# Patient Record
Sex: MALE | Race: WHITE
[De-identification: names, ages, dates, MRNs, and addresses within clinical notes are randomized per-mention and may not be internally consistent; named-entity substitution may affect disease eponyms.]

---

## 2018-10-03 ENCOUNTER — HOSPITAL ENCOUNTER (EMERGENCY)
Dept: HOSPITAL 46 - ED | Age: 33
Discharge: HOME | End: 2018-10-03
Payer: COMMERCIAL

## 2018-10-03 VITALS — HEIGHT: 65 IN | BODY MASS INDEX: 24.99 KG/M2 | WEIGHT: 150 LBS

## 2018-10-03 DIAGNOSIS — Z79.899: ICD-10-CM

## 2018-10-03 DIAGNOSIS — K21.9: Primary | ICD-10-CM

## 2018-10-03 DIAGNOSIS — K52.9: ICD-10-CM

## 2019-01-03 ENCOUNTER — HOSPITAL ENCOUNTER (OUTPATIENT)
Dept: HOSPITAL 46 - DS | Age: 34
Discharge: HOME | End: 2019-01-03
Attending: SURGERY
Payer: COMMERCIAL

## 2019-01-03 VITALS — BODY MASS INDEX: 23.32 KG/M2 | WEIGHT: 139.99 LBS | HEIGHT: 65 IN

## 2019-01-03 DIAGNOSIS — R59.0: ICD-10-CM

## 2019-01-03 DIAGNOSIS — K59.00: ICD-10-CM

## 2019-01-03 DIAGNOSIS — K21.0: ICD-10-CM

## 2019-01-03 DIAGNOSIS — Z79.899: ICD-10-CM

## 2019-01-03 DIAGNOSIS — K81.9: Primary | ICD-10-CM

## 2019-01-03 PROCEDURE — BF13YZZ FLUOROSCOPY OF GALLBLADDER AND BILE DUCTS USING OTHER CONTRAST: ICD-10-PCS | Performed by: SURGERY

## 2019-01-03 PROCEDURE — 0FT44ZZ RESECTION OF GALLBLADDER, PERCUTANEOUS ENDOSCOPIC APPROACH: ICD-10-PCS | Performed by: SURGERY

## 2019-01-03 NOTE — NUR
PT SITTNG UP IN BED, ALERT AND ORIENTED. HE SEEMS PREPARED, MENTIONED THAT HE
HAS HAD PAIN AND DISCOMFORT WITH RUFUS FOR ABOUT A YEAR-LOOKING FORWARD TO
SOME RELIEF. FEW QUESTIONS, PT REQUESTED PRAYER. WILL FOLLOW AS NEEDED

## 2019-01-03 NOTE — NUR
PATIENT ARRIVES BACK TO DAY SURGERY WITH SQUINTED EYES. PATIENT IS WRITING IN
THE BED. CALL LIGHT W/IN REACH. ICED WATER GIVEN. PRN GIVEN FOR PAIN.

## 2019-01-03 NOTE — NUR
PATIENT RESTING W/EYES CLOSED. WAKES EASILY WHEN THIS RN ENTERS THE ROOM.
PATIENT DENIES URGE TO VOID. CALL LIGHT W/IN REACH.

## 2019-01-03 NOTE — NUR
01/03/19 Flex9 Lainey Whitley
1109 PT ARRIVED TO PACU WITH ORAL AIRWAY IN PLACE, O2 MASK 6L, AND
PT NONAROUSABLE TO PAINFUL STIMULI. CRNA DOING JAW TRUST TO MAINTAIN
AIRWAY.
 
1116 ORAL AIRWAY REMAINS IN PLACE, JAW THRUST NEEDED OFF AND ON TO
MAINTAIN AIRWAY. PT SLIGHTLY REACTIVE TO PAIN. VSS. RESP EVEN AND
UNLABORED BUT SHALLOW.

## 2019-01-04 NOTE — OR
[facility]
                                    [address], [place]  [address]
_________________________________________________________________________________________
                                                                 Signed   
 
 
DATE OF OPERATION:
01/03/2019
 
SURGEON:
Loyda Song MD
 
PREOPERATIVE DIAGNOSIS:
Acalculous cholecystitis.
 
POSTOPERATIVE DIAGNOSIS:
Acalculous cholecystitis with cholesterol debris within gallbladder.
 
PROCEDURES:
1. Laparoscopic cholecystectomy with intraoperative cholangiogram.
2. Surgeon-directed fluoroscopy.
 
ANESTHESIA:
General endotracheal.
 
ANESTHESIOLOGIST:
Kumar Stewart CRNA.
 
INDICATION:
This 33-year-old white man is a patient of Dr. Luz Hua and originally referred
for consideration of intractable reflux problems.  Upper endoscopy did confirm hiatal
hernia and findings suggestive of low-grade esophagitis.  Notably, however, he was not
much benefitted from PPI medication as well as Carafate, not completely so.  Further
questioning reveals he had symptoms highly suggestive of biliary disease as well.  On
that basis, gallbladder ultrasound was performed, which showed no sign of stones.
Subsequent CCK HIDA test was performed (actually fat stimulated HIDA scan), which showed
essentially no ejection fraction and reproduction of nausea and upper abdominal pain
symptoms.  On that basis, he is considered to have acalculous cholecystitis and is
admitted at this time for cholecystectomy preferred by laparoscopic approach.  The risks
of bleeding, infection, bile duct injury, need for open procedure, and failure to cure
his symptoms were reviewed in detail and he understood and wished to proceed.  Notably,
if he should not have complete resolution of his symptoms with this operation,
consideration will be made for antireflux operation. 
 
DESCRIPTION OF PROCEDURE:
The patient was brought to the operating room, given a general endotracheal anesthetic.
Preoperative antibiotic Ancef was given.  Sequential compression device stockings and
heparin subcutaneously administered.  The abdomen was clipped and prepared with a
 
    Electronically Signed By: LOYDA SONG MD  01/04/19 1620
_________________________________________________________________________________________
PATIENT NAME:     LUZ ZIEGLER                
MEDICAL RECORD #: [id number]            OPERATIVE REPORT              
          ACCT #: [id number]  
DATE OF BIRTH:   [birth year]            REPORT #: [id number]      
PHYSICIAN:        LOYDA SONG MD                 
PCP:              LUZ HUA MD           
REPORT IS CONFIDENTIAL AND NOT TO BE RELEASED WITHOUT AUTHORIZATION
 
 
                                  [facility]
                                    [address], [place]  [address]
_________________________________________________________________________________________
                                                                 Signed   
 
 
chlorhexidine solution and draped sterilely.  An infraumbilical incision was made and
using an open Kay cannula technique, pneumoperitoneum was achieved to a level of 14
mmHg with carbon dioxide gas.  Intraabdominal inspection showed no sign of ascites or
carcinomatosis.  The liver appeared normal.  Three additional trocars were placed in
usual configuration in the subxiphoid, right midclavicular, and right anterior axillary
line.  A 10 mm port was used in the epigastric area.  Ports of 5 mm were used in the
subcostal area. 
 
The gallbladder was exposed and elevated and adhesions of omentum to the undersurface
were taken down with blunt electrocautery dissection for better elevation of the
gallbladder itself.  The infundibulum was grasped and using blunt and electrocautery
dissection with a very meticulous technique, the cystic duct was dissected free from
surrounding structures.  A small branch of the cystic artery to the gallbladder proper
was noted and secured and divided as well.  The cystic duct was surprisingly small in
its size.  A clip was applied across gallbladder cystic duct junction and a transverse
choledochotomy made in the cystic duct.  Attempts to intubate the cystic duct with the
Martínez type cholangiocatheter were unsuccessful.  An additional transverse choledochotomy
was made in the cystic duct and mindful that the duct was nearly the size of the
catheter itself.  Ultimately, the tip of the catheter could be insinuated into the
opening though not threaded down the cystic duct.  This was enough to allow for flow,
however. 
 
Intraoperative cholangiography was undertaken using surgeon-directed fluoroscopy showing
free flow of contrast in biliary tree with prompt emptying into the duodenum.  There was
no sign of biliary anomaly, filling defect or other abnormality.  The catheter was
removed and the cystic duct was triply clipped and divided and the gallbladder dissected
free in a retrograde fashion using electrocautery.  A small rent was made in the
gallbladder, which allowed for spillage of bile, but no stones.  This was quickly
grasped and only minimal bile spillage was noted.  The gallbladder was placed in an
endobag and extracted through the infraumbilical port site without problem, opened on
the back table and found to have chronic inflammatory change as well as bits of yellow
cholesterol stone type debris.  There are no well-formed stones, however. 
 
Irrigation was undertaken in subhepatic space and over the liver clearing the fluid
entirely.  The clips appeared to be secure in the cystic duct and elsewhere.  The
trocars were removed under direct visualization showing no sign of bleeding. 
 
The infraumbilical incision was secured with interrupted 0 Vicryl suture as well as a
running 0 PDS suture.  Irrigation was undertaken in all the wounds.  A 20 mL of 0.25%
Marcaine with epinephrine was injected locally.  The skin was then closed with
interrupted 3-0 Vicryl.  Steri-Strips were applied.  The patient was ultimately
extubated and transferred to recovery in good condition having suffered no complication.
 
    Electronically Signed By: LOYDA SONG MD  01/04/19 1620
_________________________________________________________________________________________
PATIENT NAME:     LUZ ZIEGLER                
MEDICAL RECORD #: [id number]            OPERATIVE REPORT              
          ACCT #: [id number]  
DATE OF BIRTH:   [birth year]            REPORT #: [id number]      
PHYSICIAN:        LOYDA SONG MD                 
PCP:              LUZ HUA MD           
REPORT IS CONFIDENTIAL AND NOT TO BE RELEASED WITHOUT AUTHORIZATION
 
 
                                  [facility]
                                    [address] [place]  [address]
_________________________________________________________________________________________
                                                                 Signed   
 
 
 Sponge, needle, and instrument counts reported as correct x3. 
 
 
 
            ________________________________________
            MD HAYLEY Combs/TAMMY
Job #:  [id number]
DD:  01/03/2019 11:15:59
DT:  01/03/2019 12:40:38
 
cc:            Luz Hua MD
 
 
Copies:  LUZ HUA MD
~
 
 
 
 
 
 
 
 
 
 
 
 
 
 
 
 
 
 
 
 
 
 
 
 
 
    Electronically Signed By: LOYDA SONG MD  01/04/19 1620
_________________________________________________________________________________________
PATIENT NAME:     LUZ ZIEGLER                
MEDICAL RECORD #: [id number]            OPERATIVE REPORT              
          ACCT #: [id number]  
DATE OF BIRTH:   [birth year]            REPORT #: [id number]      
PHYSICIAN:        LOYDA SONG MD                 
PCP:              LUZ HUA MD           
REPORT IS CONFIDENTIAL AND NOT TO BE RELEASED WITHOUT AUTHORIZATION

## 2020-05-22 NOTE — OR
[facility]
                                    [address], [place]  [address]
_________________________________________________________________________________________
                                                                 Signed   
 
 
DATE OF OPERATION:
05/22/2020
 
SURGEON:
Loyda Song MD
 
PREOPERATIVE DIAGNOSES:
Severe and worsening gastroesophageal reflux symptoms including spontaneous
regurgitation, consideration for operative management. 
 
POSTOPERATIVE DIAGNOSES:
1. Small hiatal hernia with mild chronic distal esophagitis.
2. Antral gastritis without evidence of H. pylori on CLOtest.
3. Mildly enlarged ampulla of Vater.
 
PROCEDURE:
Esophagogastroduodenoscopy with biopsy.
 
ANESTHESIA:
Intravenous sedation, fentanyl 100 mcg, Versed 4 mg total.
 
INDICATIONS:
This 35-year-old white man is patient of Dr. Luz Hua and is a  in
the [employer] [facility].  He was seen a year ago or so with reflux problems, at which
point he was taking 4 Prilosec daily.  Upper endoscopy did confirm a small hiatal
hernia.  Consideration been made for anti-reflux operation.  He did undergo video
esophagram, which showed significant spontaneous regurgitation and reflux and only a
small hiatal hernia.  Time has not improved the situation much.  He is markedly disabled
by what sounds like spontaneous regurgitation and reflux type symptoms.  The symptoms
are worsened by red meat.  Notably, he has had cholecystectomy in the past.  He has most
benefitted by Carafate taken on a q.i.d. basis.  He did not have much improvement with
PPI medication previously.  He is admitted at this time to undergo upper endoscopy,
anticipating possible anti-reflux operation.  The risks of bleeding, infection, and
perforation related to upper endoscopy was reviewed with him.  He understands and wished
to proceed. 
 
FINDINGS:
The vocal cords and surrounding soft tissue were normal.  There was no sign of
inflammation or edema.  The esophagus itself looked reasonably good except in the distal
portion, where there was mild inflammation.  There was no sign of Parry's epithelium
or stricture.  The stomach itself had a small amount of bile within the rugal folds
appeared normal.  There was antral gastritis, but without sign of ulceration proper.
 
    Electronically Signed By: LOYDA SONG MD  05/22/20 2230
_________________________________________________________________________________________
PATIENT NAME:     TONEYLUZ JORGE                
MEDICAL RECORD #: [id number]            OPERATIVE REPORT              
          ACCT #: [id number]  
DATE OF BIRTH:   [birth year]            REPORT #: [id number]      
PHYSICIAN:        LOYDA SONG MD                 
PCP:              LUZ HUA MD           
REPORT IS CONFIDENTIAL AND NOT TO BE RELEASED WITHOUT AUTHORIZATION
 
 
                                  [facility]
                                    [address], [place]  [address]
_________________________________________________________________________________________
                                                                 Signed   
 
 
The pylorus was normal.  The duodenum was normal except for mildly enlarged ampulla of
Vater.  Retroflexed view did show a small hiatal hernia.  The retroflexed scope could be
withdrawn into the esophagus itself testifying to the poor flap valve itself.  CLOtest
was negative 15 minutes post procedure. 
 
DESCRIPTION OF PROCEDURE:
The patient was brought to the endoscopy suite and given topical Hurricaine spray
hypopharyngeal anesthesia.  A bite block was placed and intravenous sedation given to
the point of slurred speech and nystagmus with full cardiopulmonary monitoring. 
 
An Olympus video upper endoscope was passed in the hypopharynx.  The vocal cords were
visualized as normal as was the surrounding soft tissue.  Scope was advanced to the
esophagus without problem throughout its length, it looked reasonably normal except in
the distal portion where there was mild chronic inflammatory change.  The scope was
passed to the stomach, which was insufflated with air.  A small amount of bilious fluid
within it.  Antral motility was normal.  Rugal folds were normal.  The antrum did have a
punctate appearance suggestive of chronic inflammation.  Pylorus was normal without sign
of distortion.  The scope was passed through it and the duodenum.  Duodenum proper was
normal.  However, the ampulla was slightly enlarged.  Biopsies were taken of the
duodenal mucosa to assess for celiac disease.  The scope was then withdrawn to the
distal stomach.  Antral biopsies performed for both BEATRICE and pathologic testing.
Retroflexed view was undertaken, showing a flap valve that was present, but somewhat
loose and was space next to the scope of about 1 cm or 2.  Retroflexed view and
withdrawal of scope in the J position, allowed for visualization of the esophagus
testifying to the lax tone of the flap valve.  The scope was straightened withdrawn.
Biopsies taken of the distal esophageal mucosa.  Further withdrawal of the biopsy of the
mid esophagus.  Scope was removed.  The patient was taken to recovery room in good
condition. 
 
CONCLUDING DIAGNOSES:
1. Poor flap valve and clinical significant and severe medically refractory
gastroesophageal reflux. 
2. Antral gastritis.
 
PLAN:
We will review his pathology reports in light of his symptoms.  I have asked him to
continue with his Carafate as usual, but he has added Prilosec foot to assess if it has
any benefit to symptom control.  Consideration is made for anti-reflux surgery in the
near future per his request. 
 
 
 
 
    Electronically Signed By: LOYDA SONG MD  05/22/20 8050
_________________________________________________________________________________________
PATIENT NAME:     LUZ ZIEGLER JORGE                
MEDICAL RECORD #: [id number]            OPERATIVE REPORT              
          ACCT #: [id number]  
DATE OF BIRTH:   [birth year]            REPORT #: [id number]      
PHYSICIAN:        LOYDA SONG MD                 
PCP:              LUZ HUA MD           
REPORT IS CONFIDENTIAL AND NOT TO BE RELEASED WITHOUT AUTHORIZATION
 
 
                                  [address]  [address]
_________________________________________________________________________________________
                                                                 Signed   
 
 
            ________________________________________
            MD HAYLEY Combs/MODL
Job #:  [id number]
DD:  05/22/2020 10:12:13
DT:  05/22/2020 12:45:35
 
cc:            Luz Hua MD
 
 
Copies:  LUZ HUA MD
~
 
 
 
 
 
 
 
 
 
 
 
 
 
 
 
 
 
 
 
 
 
 
 
 
 
 
 
 
 
    Electronically Signed By: LOYDA SONG MD  05/22/20 2230
_________________________________________________________________________________________
PATIENT NAME:     LUZ ZIEGLER                
MEDICAL RECORD #: [id number]            OPERATIVE REPORT              
          ACCT #: [id number]  
DATE OF BIRTH:   [birth year]            REPORT #: [id number]      
PHYSICIAN:        LOYDA SONG MD                 
PCP:              LUZ HUA MD           
REPORT IS CONFIDENTIAL AND NOT TO BE RELEASED WITHOUT AUTHORIZATION

## 2020-05-22 NOTE — NUR
05/22/20 1028 Sheets,Lainey
1005 PT ARRIVED TO PACU, ASLEEP. VSS. RESP EVEN AND UNLABORED ON 3L
VIA NC. MASK PLACED ON PT.
 
1014 MD AT BEDSIDE TALKING TO PT.

## 2020-05-26 NOTE — PATH
[facility]
                                    [address], [place]  [address]
_________________________________________________________________________________________
                                                                 Signed   
 
 
 
SPECIMEN(S): A DUODENUM
SPECIMEN(S): B ANTRUM/PYLORUS
SPECIMEN(S): C LOWER ESOPHAGUS
SPECIMEN(S): D MIDDLE ESOPHAGUS
 
SPECIMEN SOURCE:
A. DUODENUM
B. ANTRUM/PYLORUS
C. LOWER ESOPHAGUS
D. MIDDLE ESOPHAGUS
 
CLINICAL HISTORY:
Reflux. Postop: Mild esophagitis, mild hiatal hernia.
MICROSCOPIC DESCRIPTION:
Histologic sections of all submitted blocks are examined by light microscopy. 
These findings, together with the gross examination, support the pathologic 
diagnosis. 
 
FINAL PATHOLOGIC DIAGNOSIS:
A. Duodenum, biopsy:
-  Duodenal mucosa with no histopathologic abnormality.
-  Negative for dysplasia or malignancy.
B.  Stomach, antrum, biopsy:
-  Antral/oxyntic mucosa with chronic, inactive gastritis.
-  Negative for Helicobacter organisms on HE stain.
-  Negative for dysplasia or malignancy.
C.  Esophagus, lower, biopsy:
-  Squamous mucosa with chronic inflammation and reactive changes, compatible 
with reflux esophagitis. 
-  Negative for intestinal metaplasia, dysplasia, or malignancy.
D.  Esophagus, middle, biopsy:
-  Squamous mucosa with no histopathologic abnormality.
-  Negative for intestinal metaplasia, dysplasia, or malignancy.
NAL:cml:C2NR
 
GROSS DESCRIPTION:
Four specimens are received in four containers, labeled "SCOOBY."
A.  The specimen, labeled "SCOOBY, duodenum biopsy," is received in formalin and 
consists of two tan soft tissue fragment(s) that measure 0.1 cm in greatest 
dimension. The specimen is entirely submitted 
in cassette (A1).
 
                                                                                    
_________________________________________________________________________________________
PATIENT NAME:     LUZ ZIEGLER                
MEDICAL RECORD #: [id number]            PATHOLOGY                     
          ACCT #: [id number]       ACCESSION #: [id number]     
DATE OF BIRTH:   [birth year]            REPORT #: [id number]       
PHYSICIAN:        CHARO SHELLEY              
PCP:              LUZ BOATENG MD           
REPORT IS CONFIDENTIAL AND NOT TO BE RELEASED WITHOUT AUTHORIZATION
 
 
                                  [facility]
                                    [address], [place]  [address]
_________________________________________________________________________________________
                                                                 Signed   
 
 
B.  The specimen, labeled "SCOOBY, antrum biopsy," is received in formalin and 
consists of two tan soft tissue fragment(s) that measure 0.2 cm in greatest 
dimension. The specimen is entirely submitted in 
cassette (B1).
C.  The specimen, labeled "SCOOBY, lower esophagus biopsy," is received in formalin 
and consists of four tan soft tissue fragment(s) that measure 0.4 cm in 
greatest dimension. The specimen is entirely 
submitted in cassette (C1).
D.  The specimen, labeled "SCOOBY, middle esophagus biopsy," is received in 
formalin and consists of one tan soft tissue fragment that measures 0.1 cm in 
greatest dimension. The specimen is entirely 
submitted in cassette (D1).
 
JS (under the direct supervision of a pathologist)
The Gross Description was prepared using a voice recognition system.  The 
report was reviewed for accuracy; however, sound-alike word errors, addition 
and/or deletions may occur.  If there is any 
question about this report, please contact Client Services.
 
PERFORMING LABORATORY:
The technical component was performed by [facility], [address] [place] [id number] (Medical Director: Barbara Nguyen MD; CLIA# [id number]). 
[facility], [address] [address] (CLIA# [id number]). 
 
Diagnostician:  Phyllis Shaikh MD
Pathologist
Electronically Signed 05/26/2020
 
 
Copies:                                
~
 
 
 
 
 
 
 
 
 
 
                                                                                    
_________________________________________________________________________________________
PATIENT NAME:     LUZ ZIEGLER                
MEDICAL RECORD #: [id number]            PATHOLOGY                     
          ACCT #: [id number]       ACCESSION #: [id number]     
DATE OF BIRTH:   [birth year]            REPORT #: [id number]       
PHYSICIAN:        CHARO SHELLEY              
PCP:              LUZ BOATENG MD           
REPORT IS CONFIDENTIAL AND NOT TO BE RELEASED WITHOUT AUTHORIZATION

## 2020-06-12 NOTE — DS
[facility]
                                    [address], [place]  [address]
_________________________________________________________________________________________
                                                                 Draft    
 
 
ADMISSION DATE:  06/23/2020
 
DISCHARGE DATE:  06/26/2020
 
REASON FOR ADMISSION:
This 35-year-old white man is a patient of Dr. Luz Hua and has been evaluated
in the past in 2018 for rather significant reflux symptoms.  Over time, he has had a
progression of his symptoms despite maximal medical therapy including PPI medication and
Carafate.  He has spontaneous regurgitation in bending over.  Upper endoscopy confirmed
chronic esophagitis without Parry's epithelium and a small hiatal hernia.  A video
esophagram in December of 2018 showed normal motility, a small hiatal hernia, and
moderate-to-massive reflux with Valsalva maneuver.  He does have spontaneous
regurgitation.  He is admitted at this time to undergo Hill posterior gastropexy
(reconstruction of the gastroesophageal junction) with intraoperative manometrics. 
 
PERTINENT PHYSICAL EXAMINATION:
GENERAL:  Pleasant white man, in no acute distress. 
CHEST:  Clear. 
HEART:  Regular without murmur. 
ABDOMEN:  Soft and flat.  Easily palpated.  There is no mass, tenderness, or ascites. 
EXTREMITIES:  Show no clubbing, cyanosis, or edema.
 
HOSPITAL COURSE:
On June 23, 2020, he underwent Hill repair (reconstruction of the gastroesophageal
junction with posterior gastropexy).  He had preoperative placement of the quadratus
lumborum block and intraoperative placement of On-Q pain pump catheter.  Intraoperative
manometric showed a peak pressure of approximately 48 mmHg over a 4 cm intraabdominal
segment.  The reconstructed flap valve felt optimal. 
 
Postoperatively, for the first 12 hours, he was maintained with a decompressive
manometric tube and then tube was removed the following day.  He was begun on clear
liquids, which he tolerated well and was advanced to a full liquid diet which he
tolerated well also.  He does have episodic belching, but absolutely no reflux symptoms,
no spontaneous regurgitation, and tolerating pain pills by discharge quite well.  He
will maintain a full liquid diet for the time being with special care to avoid meat and
bread. 
 
We will plan to see him back in approximately 4 weeks and liberalize his diet at that
time.  Attempts were made to maintain an opiate-free operative experience, though he did
require very limited occasions of Dilaudid intravenously administered and Dilaudid oral
pain medication, but rare indeed was the need for use of it. 
 
 
                                                                                    
_________________________________________________________________________________________
PATIENT NAME:     LUZ ZIEGLER                
MEDICAL RECORD #: [id number]            DISCHARGE SUMMARY             
          ACCT #: [id number]  
DATE OF BIRTH:   [birth year]            REPORT #: [id number]      
PHYSICIAN:        LOYDA SONG MD                 
PCP:              LUZ HUA MD           
REPORT IS CONFIDENTIAL AND NOT TO BE RELEASED WITHOUT AUTHORIZATION
 
 
                                  [facility]
                                    [address], [place]  [address]
_________________________________________________________________________________________
                                                                 Draft    
 
 
MEDICATIONS WILL INCLUDE:
1. Dilaudid 2 mg tablets 2 to 4 mg p.o. q.4 hours as needed for pain, #14, no refill.
2. Tylenol Extra Strength 500 mg tablets, 2 tablets p.o. crushed and taken in jelly or
other oral substance q.6 hours as needed for pain. 
3. Motrin 600 mg p.o. q.6 hours as needed for pain, also crushed.
4. He will continue his usual medication of terbinafine 250 mg p.o. as needed and
fluticasone Allergy Relief spray nasally for allergies.  He will discontinue his
sucralfate tablet. 
 
DISCHARGE DIAGNOSES:
1. Medically refractory gastroesophageal reflux with small hiatal hernia and massive
reflux on upper GI, status post Hill repair (reconstruction of the gastroesophageal
junction with posterior gastropexy and intraoperative manometrics on June 23, 2020). 
2. Reactive airways.
 
 
 
            ________________________________________
            MD HAYLEY Combs/MODL
Job #:  [id number]
DD:  06/26/2020 15:07:10
DT:  06/26/2020 19:43:56
 
cc:            Luz Hua MD
 
 
Copies:  LUZ HUA MD
~
 
 
 
 
 
 
 
 
 
 
 
 
                                                                                    
_________________________________________________________________________________________
PATIENT NAME:     LUZ ZIEGLER                
MEDICAL RECORD #: [id number]            DISCHARGE SUMMARY             
          ACCT #: [id number]  
DATE OF BIRTH:   [birth year]            REPORT #: [id number]      
PHYSICIAN:        LOYDA SONG MD                 
PCP:              LUZ HUA MD           
REPORT IS CONFIDENTIAL AND NOT TO BE RELEASED WITHOUT AUTHORIZATION

## 2020-06-23 NOTE — NUR
ASSESSMENT COMPLETED. NO CHANGES. PT ASSISTED TO SIDE OF BED TO ATTEMPT TO
VOID. WIFE AT BEDSIDE. REPORTS PAIN 5/10.

## 2020-06-23 NOTE — NUR
POST OP VITALS TAKEN AND STABLE. NO CHANGE TO SHADOWING OD MIDLINE INCISION.
PT REPORTING PAIN 5/10. WATER PROVIDED. DENIES NAUSEA. PT STILL DROWSY. HAS
NOT OPENED EYES, JUST TALKS TO NURSE.

## 2020-06-23 NOTE — NUR
06/23/20 1002 Gretchen Hu
0936 PATIENT ARRIVES TO PACU RESTING WITH EYES CLOSED. DOES NOT OPEN
EYES WITH VERBAL COMMAND. DOES MOVE ARMS WITH PAINFUL STIMULI, AND
REPOSITIONS SELF TO RIGHT SIDE. RESP EVEN AND UNLABORED, MASK AT 6
LITERS. NG LEFT NARE WITH 4 PORT CLAMPED. ON-Q PUMP WITH 2 PORTS
UNCLAMPED ON ARRIVAL TO PACU.
1000 PATIENT CONTINUES TO REST WITH EYES CLOSED. RESP EVEN AND
UNLABORED, MASK CONTINUED AT 6 LITERS.

## 2020-06-23 NOTE — NUR
BEDSIDE REPORT RECEIVED FROM MARSHA ELLIOTT. pt RESTING IN BED, HOB ELEVATED. NGT
TO LOW INT WALL SUCTION. pt RATES PAIN 4/10 IN ABDOMEN, SMALL AMT SS DRAINAGE
ON MIDLINE DRESSING, ON Q PUMP IN PLACE. CALL LIGHT IN REACH. WIFE IN ROOM. NO
REQUESTS AT THIS TIME, PLAN TO AMBULATE HALLWAY THIS EVENTING.

## 2020-06-23 NOTE — NUR
CALL LIGHT ANSWERED. PRN OFIRMEV ADMINISTERED FOR ABDOMINAL PAIN. IV FLUSHED,
FLUID INFUSING WNL AS ORDERED. pt C/O NAUSEA, PRN NAUSEA MEDICATION
ADMINISTERED. BOWEL TONES HYPOACTIVE, ABD DRAINAGE UNCHANGED ON MIDLINE
DRESSING. pt DEMONSTRATES IS USE X 3. DROWSY AFTER PRN NAUSEA MEDICATION. CALL
LIGHT IN REACH. WIFE IN ROOM. SPO2 WNL ON RA. VSS.

## 2020-06-23 NOTE — NUR
PHONE CALL FROM MD, ORDERS RECEIVED FOR PRN LOSANGES, PRN OFIRMEV, NO
CARBONATED BEVERAGES, NO HOT OR COLD BEVERAGES REPEATED BACK. OKAY FOR pt NOT
TO AMBULATE TONIGHT TO AVOID DRY HEAVING, ABDOMINAL STRAINING. LEAVE NGT IN
MORNING, MD WILL ASSESS. ALL ORDERS REPEATED BACK TO VERIFY.

## 2020-06-23 NOTE — NUR
PT REPORTING 5/10 PAIN, TYLENOL/TORDOL UNVAILABLE. 0.5MG DILAUDID ADMINSTERED.
CPOX IN PLACE. LR AT 85 INFUSING. PT DRANK SOME WATER. REFUSING TO TRY LIQUID
LUNCH TRAY. ATTEMPTED VOID UNSUCCESSFUL. CALL LIGHT IN REACH.

## 2020-06-23 NOTE — NUR
PT DOING WELL. VOIDED 200ML SINCE SURGERY. AMBULATED HALLS X1 LAP. PAIN
CONTROLLED WITH TORIDOL AND TYLENOL. DILAUDID USED ONE TIME D/T TYLENOL AND
TORDOL BEING UNAVALALE. NG TUBE TO LIWS. MINDLINE WITH SOME SHADOWING BUT WNL.
ON-CUE PUMP IN PLACE. BOWEL TONES HYPOACTIVE. CLEARS FOR COMFORT. DC NG TUBE
IN AM.

## 2020-06-23 NOTE — NUR
PT ARRIVED TO FLOOR VIA BED. PT IS DROWSY BUT ANSWERS QUESTIONS APPROPRIATLY.
LR AT 85 STARTED. NG TUBE PLACED TO LIWS. SCD'S ON. MIDLINE DRESSING WITH SOME
SMALL SHADOWING. ON CUE PUMP IN PLACE. VITALS TAKEN AND STABLE.

## 2020-06-23 NOTE — NUR
CALL LIGHT ANSWERED. URINAL EMPTIED X 2, 650 ML VOID, CLEAR YELLOW URINE. pt
DENIES PAIN AT THIS TIME STATES "I'M OKAY". NGT IN PLACE. IVF INFUSING WNL AS
ORDERED. SCDS ON. pt EDUCATED ON ROOM TEMP PO FLUIDS, PLAN OF CARE. QUESTIONS
ANSWERED. CALL LIGHT IN REACH.

## 2020-06-23 NOTE — OR
[facility]
                                    [address], [place]  [address]
_________________________________________________________________________________________
                                                                 Signed   
 
 
DATE OF OPERATION:
06/23/2020
 
SURGEON:
Loyda Snog MD
 
PREOPERATIVE DIAGNOSES:
1. Medically refractory gastroesophageal reflux disease with small hiatal hernia.
2. Spontaneous regurgitation.
 
POSTOPERATIVE DIAGNOSES:
1. Medically refractory gastroesophageal reflux disease with small hiatal hernia.
2. Spontaneous regurgitation.
 
PROCEDURE:
1. Hill repair (reconstruction of the GE junction with posterior gastropexy).
2. Intraoperative manometrics.
3. Placement of On-Q pain pump catheter, bilateral subcostal TAP block type.
 
ANESTHESIA:
General endotracheal, Neftaly Kevin CRNA. 
Pre operative quadratus lumborum block
 
INDICATION:
This 35-year-old white man is a patient of Dr. Luz Hua.  He was evaluated in
the past by me in 2018 for rather significant reflux problems.  He was marginally
benefitted by omeprazole, but ultimately most benefitted by Carafate.  He has had
recurrence and persistence of his symptoms over time and complains of spontaneous
regurgitation in particular after bending over, straining, or lifting with full stomach.
 He did undergo videoesophagram to assess motility on December 3, 2018 showing normal
motility, small hiatal hernia and moderate to massive reflux with Valsalva maneuver.  He
is not excessively obese, but he notes when he increases his weight generally, his
symptoms are worsened.  More recently, he has been taking PPI medication at least b.i.d.
as well as Carafate.  An upper endoscopy has been performed, which showed chronic
esophagitis and a small hiatal hernia.  No sign of neoplasm or Parry's epithelium. 
 
After full consideration of the various options of management, both surgical and
nonsurgical and vrious types
of operative interventions including endoscopic,  laparoscopic
and open type procedures, he opts to undergo Hill posterior gastropexy by open technique
with intraoperative manometrics.  The risks of bleeding, infection, failure of the
operation, splenic injury, and other unforeseen complications were all reviewed in
 
    Electronically Signed By: LOYDA SONG MD  06/23/20 1420
_________________________________________________________________________________________
PATIENT NAME:     ZIEGLERLUZ ALEJO JORGE                
MEDICAL RECORD #: [id number]            OPERATIVE REPORT              
          ACCT #: [id number]  
DATE OF BIRTH:   [birth year]            REPORT #: [id number]      
PHYSICIAN:        LOYDA SONG MD                 
PCP:              LUZ HUA MD           
REPORT IS CONFIDENTIAL AND NOT TO BE RELEASED WITHOUT AUTHORIZATION
 
 
                                  [facility]
                                    [address], [place]  [address]
_________________________________________________________________________________________
                                                                 Signed   
 
 
detail.  He understands and wished to proceed. 
 
FINDINGS:
Though the patient is not obese, he had a fair amount of intraabdominal adiposity after
all.  This included the omentum.  The GE junction was somewhat patulous.  The spleen was
normal as was the liver.  There was surgical absence of the gallbladder.  Reconstructed
GE junction was accomplished without problem showing an optimal palpable flap valve and
intraoperative manometric showing a peak pressure of approximately 40 mmHg over a 3-4 cm
intraabdominal segment.  The palpable reconstructed flap valve appeared optimal. 
 
Notably, the patient underwent a quadratus lumborum block by the anesthetist prior to
anesthesia.  On-Q pain pump catheters were placed as well.  An opiate-free anesthetic
has thus far been accomplished. 
 
DESCRIPTION OF PROCEDURE:
The patient was brought to the operating room from the preoperative area having
undergone a quadratus lumborum block by the anesthetist.  He was given a general
endotracheal anesthetic without problem.  Preoperative antibiotic Ancef was given.
Sequential compression device stockings were used and heparin subcutaneously
administered.  An incision was made extending from the tip of the xiphoid to above the
umbilicus later extended inferiorly for better exposure.  The abdomen was entered
without problem showing no sign of ascites or carcinomatosis.  The colon appeared normal
as did the stomach.  Liver was quite normal.  An upper hand retractor was used to
elevate this costal margins.  Palpation behind the spleen showed it to be without
scarring.  A rolled pack was placed behind the spleen to take tension off the medial
aspect of it. 
 
The left lateral segment of liver was elevated and the gastrohepatic omentum incised
with electrocautery.  Better exposure was afforded by mobilization of the left lateral
segment of the liver freeing the diaphragmatic attachments with electrocautery and
retracting it medially. 
 
A Bookwalter retractor was attached to the table.  Medial retraction of the
left lateral segment of the liver allowed for 
good visualization of the caudate lobe of the
liver and GE junction area. 
 
Using a Bookwalter retractor and retracting the left lateral segment medially, the
phrenoesophageal ligament adherent to the right lisset was incised with electrocautery and
blunt dissection. A Martin clamp was applied to the right lisset and sequentially
manipulated allowing for incision of the membrane securing the crura of the diaphragm to
the GE junction and the esophagus. 
 
    Electronically Signed By: LOYDA SONG MD  06/23/20 3622
_________________________________________________________________________________________
PATIENT NAME:     TONEYLUZ PATEL                
MEDICAL RECORD #: [id number]            OPERATIVE REPORT              
          ACCT #: [id number]  
DATE OF BIRTH:   [birth year]            REPORT #: [id number]      
PHYSICIAN:        LOYDA SONG MD                 
PCP:              LUZ HUA MD           
REPORT IS CONFIDENTIAL AND NOT TO BE RELEASED WITHOUT AUTHORIZATION
 
 
                                  [address]  [address]
_________________________________________________________________________________________
                                                                 Signed   
 
 
 
Elevation of the proximal stomach allowed for visualization of the left lisset.  This too
was freed with both electrocautery and blunt dissection.  Complete freeing of the
gastrophrenic attachments was undertaken.  There was no strict need for dividing any
short gastric vessels.  Meticulous care was maintained to free the esophagus and GE
junction from its surrounding filmy attachments.  Using the hook retractor, the stomach
was retracted to the left allowing for dissection of the preaortic fascia inferiorly to
the transverse portion of the pancreas. 
 
Consideration was made for elevating the preaortic fascia from the underlying aorta and
passing a Goodell cervical dilator however, the tissue was rather stout and it was
deemed safer to avoid such a dissection.
 given the crowded field we were dealing with.  On that
basis, security of the wrap for gastropexy would be to the preaortic fascia simply
elevating it from the underlying aorta. 
 
The left and right crura were reapproximated with two separate interrupted 0 silk
sutures with Rinku felt pledgets soaked in Betadine.  The index finger was allowed to
pass alongside the esophagus confirming that it was not excessively tight. 
 
The anterior and posterior phrenoesophageal bundles were grasped with Elton clamps to
allow precise application of the repair sutures. 
 
Sutures used for repair 
were 0 Ethibond with Rinku felt pledgets  soaked in Betadine.  This
included a seromuscular bite of the anterior stomach and anterior bundle as well as the
posterior bundle ultimately pexing to the base of the right lisset at the condensation of
the preaortic fascia using the previously placed silk sutures to elevate the preaortic
fascia away from the underlying aorta.  Four such repair sutures were placed in the
technique of David. 
 
The sutures were then secured with surgeon's throw.  Palpation of the resultant
flap valve showed it to be good. 
 
Plans were then made for intraoperative manometrics.  Using a perfusion catheter and
arterial blood pressure catheter system, intraoperative manometrics were performed in
the standard way.  The pullout pressure appeared to be approximately 38 mmHg over a 3-4
cm segment.  This was reproducible.  The sutures were secured and the calibration
undertaken showing a good waveform and optimal peak pressure.  The nasogastric
manometric tube was secured in place and allowed for decompression. 
 
Irrigation was undertaken and photographs were taken as well.  The fundus of the stomach
 
    Electronically Signed By: LOYDA SONG MD  06/23/20 1420
_________________________________________________________________________________________
PATIENT NAME:     LUZ ZIEGLER                
MEDICAL RECORD #: [id number]            OPERATIVE REPORT              
          ACCT #: [id number]  
DATE OF BIRTH:   [birth year]            REPORT #: [id number]      
PHYSICIAN:        LOYDA SONG MD                 
PCP:              LUZ HUA MD           
REPORT IS CONFIDENTIAL AND NOT TO BE RELEASED WITHOUT AUTHORIZATION
 
 
                                  [facility]
                                    [address] [place] [address]  [address]
_________________________________________________________________________________________
                                                                 Signed   
 
 
was secured to the margin of the esophageal hiatus of the diaphragm to avoid herniation
of the cardia into the mediastinum.  This was accomplished with 0 silk sutures with
Rinku felt pledgets.  Palpation of the flap valve showed it to be optimal in the
decompressive manometric tube manipulated into optimal position for decompression
postoperatively. 
 
Plans were then made for closure.  The left lateral 
segment of the liver was allowed to return to its
natural position.  The pack behind the spleen was removed without problem.  Left and
right On-Q pain pump catheters were placed just superficial to the transversus abdominis
muscle bilaterally.  The midline fascia was reapproximated with running bidirectional #1
PDS suture.  Subcutaneous tissue was irrigated.  Skin closed with running subcuticular
3-0 Vicryl.  Steri-Strips were applied.  Op Sites were applied to the On-Q catheters and
the catheters were secured to the On-Q pump device.  A silver sponge dressing was
applied. 
 
The patient was ultimately extubated and transferred to recovery room in good condition
having suffered no known complications.  Sponge, needle, and instrument counts were
reported as correct.  Blood loss was rather minimal certainly less than 25 mL in
aggregate. 
 
 
 
            ________________________________________
            MD HAYLEY Combs/MODL
Job #:  [id number]
DD:  06/23/2020 10:10:20
DT:  06/23/2020 11:18:23
 
cc:            Luz Hua MD
 
 
Copies:  LUZ HUA MD
~
 
 
 
 
 
 
 
    Electronically Signed By: LOYDA SONG MD  06/23/20 1420
_________________________________________________________________________________________
PATIENT NAME:     LUZ ZIEGLER                
MEDICAL RECORD #: [id number]            OPERATIVE REPORT              
          ACCT #: [id number]  
DATE OF BIRTH:   [birth year]            REPORT #: [id number]      
PHYSICIAN:        LOYDA SONG MD                 
PCP:              LUZ HUA MD           
REPORT IS CONFIDENTIAL AND NOT TO BE RELEASED WITHOUT AUTHORIZATION

## 2020-06-23 NOTE — NUR
PT REFUSING TO GET OOB TO VOID D/T 6/10 PAIN WITH MOVEMENT. IV TYLENOL
ADMISNTERED. PT AGGREED TO ATTEMPT TO VOID AFTER AND AMBULATE HALLS.

## 2020-06-24 NOTE — NUR
PT UP AND WALKING PAULSON WITH WIFE AT THIS TIME. PT DENIES ANY NAUSEA BUT DOES
STATE HE HAS PAIN WITH MOVEMENT

## 2020-06-24 NOTE — NUR
IN PTS ROOM TO GIVE MORNING MEDS AND DO ASSESSMENT. PT STATES THAT HIS PAIN IS
A 3/10. PT STATES THAT HIS NG TUBE IS NOT AS ANNOYING THIS AM. DISUCSSED WITH
PT THE PLAN FOR THE DAY, TRY TO GET PT UP AND WALKING, WE WILL DO A ROAD TEST
TO THE RESTROOM BEFORE HAVING PT WALK OUT IN THE HALLS. PT AGREEABLE TO THIS
PLAN.

## 2020-06-24 NOTE — NUR
pt SLEEPING, AWAKENS TO VOICE. VSS. RATES PAIN 3/10 IN ABDOMEN, PRN TORADOL
ADMINISTERED FOR PAIN CONTROL. APPLE JUICE PROVIDED. CALL LIGHT IN REACH. NO
ADDITIONAL REQUESTS.

## 2020-06-24 NOTE — NUR
PATIENT UP IN BED, EYES CLOSED, WOKE FOR VITALS. VITALS AND I&OS CHARTED. CALL
LIGHT IN REACH, NO OTHER NEEDS AT THIS TIME.

## 2020-06-24 NOTE — NUR
received report from sidney harrell. pt sitting up in bed. pt states that he is
starting to wake up at this time

## 2020-06-24 NOTE — NUR
Spoke with pt and his wife.  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  Pt and wife state he will ahve good support.  No
DME.  They live with their two small children.  Planned ahead and will have
childcare for the next two weeks.  Pt denies needs to go home, wife feels safe
taking him home.

## 2020-06-24 NOTE — NUR
pt RESTING IN BED WITH HOB ELEVATED, EYES CLOSED, APPEARS TO BE SLEEPING.
BREATHING UNLABORED. SPO2 WNL ON RA.

## 2020-06-24 NOTE — NUR
PT PUT ON CALL LIGHT TO REQUEST MORE PAIN MEDS. PT RATED PAIN 6/10. PROVIDED
PT WITH 30MG OF TORADOL AT THIS TIME.
PT ALSO STATED THAT THE ENSURE IS SITTING WELL ON HIS STOMACH AND HAS NO NOTED
NAUSEA AT THIS TIME

## 2020-06-24 NOTE — NUR
pt'S PAIN WELL CONTROLLED THROUGHOUT SHIFT WITH PRN PAIN MEDICATION. QS URINE
OUTPUT. IN BED THORUGHOUT SHIFT, OKAY PER MD. NGT TO LOW INT WALL SUCTION,
90 MLS OUTPUT. DRESSING DRY AND INTACT, SANGUINOUS DRAINAGE UNCHANGED
THROUGHOUT SHIFT. ON Q PUMP CATHETERS IN PLACE BILATERALLY RIGHT AND LEFT
ABDOMEN. BOWEL TONES ACTIVE, DENIES FLATUS. ABD DISTENDED, SOFT. PRN
NAUSEA MEDICATION X 1 AT START OF SHIFT. USING IS APPROPRIATELY. SCDS ON.

## 2020-06-24 NOTE — NUR
ASSESSMENT COMPLETE. pt C/0 PAIN 6/10. PRN TYLENOL ELIXIR ADMINISTERED. pt
TOLERATING ENSURE AND PUDDING WELL. REQUESTS CHICKEN BROTH WHICH IS PROVIDED.
VSS, WOUND DRESSING UNCHANGED. ON-Q IN PLACE AND 2+2 SETTING. pt REPORTS
REPORTS FREQUENT BELCHING AFTER DRINKING TOO QUICKLY. URINE OUTPUT IS GOOD AND
URINE IS CLEAR AND YELLOW. BOWEL TONES ACTIVE. NNO BM THUS FAR. CALL LIGHT
WITH IN REACH.

## 2020-06-24 NOTE — NUR
PATIENT USING THE BATHROOM. WIFE IN ROOM. PATIENT BACKS TO BED ASSISTED BY
WIFE. LINENS CHANGED BY EULALIA PATEL. VITAL SIGNS AND I&O DONE. CALL LIGHT WITHIN
REACH. NO OTHER NEEDS AT THIS TIME

## 2020-06-24 NOTE — NUR
BEDSIDE REPORT FROM NURSE MIRNA. pt GOT UP AND BEGAN WALKING THE UNIT. APPEARS
IN GOOD SPIRITS AND MOTIVATED.

## 2020-06-24 NOTE — NUR
PATIENT RESTING IN BED. WIFE IN ROOM. VITAL SIGNS AND I&O DONE. CALL LIGHT
WITHIN REACH. NO OTHER NEEDS AT THIS TIME

## 2020-06-24 NOTE — NUR
PULSE OXIMETER ALARMING, SPD ALERT, SETTINGS ADJUSTED. pt AWAKE, SATURATIONS
WNL ON RA, VSS. pt RATES PAIN 6/10 IN ABDOMEN, UNABLE TO SLEEP. PRN MEDICATION
ADMINISTERED. pt ASSESSMENT COMPLETE. BOWEL TONES ACTIVE AT THIS TIME, ABD
DISTENDED, SOFT, DENIES FLATUS. NGT TO INT WALL SUCTION. MIDLINE DRESSING
INTACT, DRY, NO NEW DRAINAGE NOTED. ON Q PUMP IN PLACE. CALL LIGHT IN REACH.
HOB ELEVATED. SCDS ON.

## 2020-06-24 NOTE — NUR
IN PTS ROOM TO CHECK ON HIM. PT STATES THAT PAIN IS CONTROLLED WELL AT THIS
TIME WITH THE TORADOL ON BOARD. PT STATES THAT HE DOESN'T NEED TYLENOL AT THIS
TIME. DISCUSSED WITH PT WHEN THE NEXT DOSE OF MEDS ARE DUE, PT UNDERSTOOD

## 2020-06-24 NOTE — NUR
PT ALERT, ORIENTED AND SUPPORTED BY HIS WIFE RESHMA. PT SEEMED IN PAIN, PT
SAID IT WAS AT 5-6. WILL INFORM MARSHA BRADLEY. BOTH FEEL INFORMED, HAD GOOD VISIT
PT REQUESTED PRAYER. WILL CONTINUE TO FOLLOW

## 2020-06-24 NOTE — NUR
PT UP TO WALK- PT TOLERATED ONE LAP WELL. PT BACK TO ROOM AND REQUESTING SOME
TYLENOL FOR PAIN 4/10. DISCUSSED WITH PT THAT WE WILL TRY THE TYLENOLAND THEN
GO TO THE DILUADID IF NEED BE

## 2020-06-24 NOTE — NUR
PT HAD A GOOD DAY. TOLERATED WELL HAVING NG TUBE TAKEN OUT, PT ALSO DID AT
LEAST 3 WALKS AROUND THE UNIT WITH HIS WIFE TODAY. PT STATES THAT HIS PAIN IS
CONTROLLED WITH TYLENOL AND TORADOL. PT HAS HAD NO NAUSEA TODAY.

## 2020-06-25 NOTE — NUR
pt HAD A RESTFUL NIGHT, WAS SEEN SLEEPING MUCH OF THE NIGHT. PAIN WAS
MAINTAINED BY PRN NON-OPIATE PAIN MEDS. VSS, BOWEL TONES ACTIVE. SURGICAL
DRESSING UNCHANGED WITH SMALL AMOUNT OF DRAINAGE ON GAUZE. ON-Q INTACT.

## 2020-06-25 NOTE — NUR
SHIFT REPORT RECEIVED FROM RACHELLE BARNEY AT BEDSIDE. PT AWAKE AND RESTING
IN BED. PT DENIES NEEDS AT THIS TIME, MIDLINE INCISION MICKI WITH STERI STRIPS.
OLD DRY SEROSANGUINEOUS SHADOWING NOTED WITH STERI STRIPS, WILL MONITOR.

## 2020-06-25 NOTE — NUR
ASKED PATIENT IF HE WOULD LIKE TO TAKE A SHOWER AND HE SAID NO. BUT MAYBE ON A
BED BATH. PATIENT IS INDEPENDENT. ALSO PATIENT WALKED 1 LAP AROUND MED SURG.
THIS MORING. PATIENT IS NOW RESTING.

## 2020-06-25 NOTE — NUR
DR SONG IN TO SEE PT, ADVANCING DIET, PT SITTING UP IN BED WATCHING TV,
DENIES NAUSEA, REQUESTING TORADOL FOR PAIN, STATES HE WILL TRY TYLENOL NEXT,
DENIES NAUSEA.

## 2020-06-25 NOTE — NUR
DR SONG IN TO SEE PT, PLEASED WITH PROGRESS, PLAN TO DC HOME IN AM, PT
TOLERATING PO, GOOD PAIN CONTROL USING TYLENOL.

## 2020-06-25 NOTE — NUR
PT ORDERED SMOOTHIE FOR LUNCH, TOLERATED WELL, RESTING ON BED WITH WIFE AT
BEDSIDE. UP AND ABOUT ROOM INDEP. TAKING FLUIDS WELL, REMAINS SL.

## 2020-06-25 NOTE — NUR
ASSESSMENT COMPLETE, SCHEDULED MEDS GIVEN (SEE EMAR). PT REPORTS PAIN HAS
IMPROVED AND IS TOLERABLE AT 3/10. MIDLINE REMAINS MICKI WITH OLD DRY
SEROSANGUINEOUS SHADOWING TO STERI STRIPS, WILL MONITOR. PT DENIES NAUSEA,
BOWEL TONES ACTIVE. IV SITE WNL, SALINE LOCKED WITH BRISKED BLOOD RETURN. PT
WATCHING A MOVIE, NO FURTHER NEEDS, CALL LIGHT IN REACH.

## 2020-06-25 NOTE — NUR
VITALS AND I&OS DONE AND CHARTED. FRESH WATER GIVEN. BEDSIDE TABLE AND CALL
LIGHT IN REACH. PT NEEDS NOTHING MORE AT THIS TIME.

## 2020-06-25 NOTE — NUR
THIS RN IN ROOM FOR 8/10 ABDOMINAL PAIN R/T POST-OP. PRN TYLENOL CRUSHED AND
GIVEN IN PUDDING PER PT REQUEST. PT VIDEO CALLING WITH FAMILY, DENIES
ADDITIONAL NEEDS, CALL LIGHT IN REACH.

## 2020-06-26 NOTE — NUR
THIS RN IN ROOM AND ROUNDING ON PT. PT AWAKE, DENIES CONCERNS OR NEED FOR PAIN
MEDICATIONS AT THIS TIME. WILL MONITOR, CALL LIGHT IN REACH.

## 2020-06-26 NOTE — NUR
PT UP ABOUT ROOM INDEP, STATES HE IS ABLE TO TAKE FLUIDS BUT IT'S SLOW GOING,
LIKE TO DRINK SMOOTHIES, INCISION WELL APPROX WITH STERI STRIPS, OLD DRAINAGE
AT SITE, NO REDNESS. STATES TYLENOL EFFECTIVE PAIN RELIEF. BREAKFAST IN ROOM.

## 2020-06-26 NOTE — NUR
DR SONG IN TO SEE PT, STATES PT IS READY TO GO HOME, DISCHARGE INSTRUCTIONS
GIVEN TO PT AND HIS WIFE, VERBALIZES UNDERSTANDING OF MEDICATIONS AND FOLLOWUP
APPOINTMENT. DENIES ANY NEEDS, WILL SHOWER WITH WIFES HELP AND THAN GO HOME.

## 2020-06-26 NOTE — NUR
ASSESSMENT COMPLETE, NO NEW CHANGES OR CONCERNS. PT DUE TO VOID, BUT DENIES
NEED TO AT THIS TIME. WILL MONITOR. MIDLINE MICKI, STERI STRIPS REMAIN IN PLACE.
PT DENIES NEED FOR PAIN MEDICATION, CALL LIGHT IN REACH.

## 2020-06-26 NOTE — NUR
PT REQUESTED DILAUDID FOR DISCOMFORT, LUNCH ORDERED, DISCUSSED DISCHARGE
INSTRUCTIONS MEDICATIONS, DIET AND EXAMPLES OF FOODS. PT FEELS HE IS DOING
WELL AND IS READY TO GO HOME.

## 2020-06-26 NOTE — NUR
PT HAD AN UNEVENTFUL NIGHT, SLEPT OFF AND ON. PAIN CONTROLLED WITH PRN TYLENOL
AND PRN TORADOL. VSS, PT CALLS APPROPERIATELY. SBA. SOFT AND BITE SIZED DIET,
TOLERATING WELL. NO NAUSEA REPORTED. SOME DIFFICULTY WITH SWALLOWING R/T
SURGERY, MICKI MIDLINE WITH STERI STRIPS. VOIDING QS, NO BM THIS SHIFT.

## 2020-06-26 NOTE — NUR
PT SITTING UP IN BED, BREAKFAST ON TABLE AT BS. PT MENTIONED HE IS TO DC TODAY
AND SEEMS TO BE READY.GAVE BLESSING

## 2020-06-26 NOTE — NUR
PT STATES DILAUDID HELP FOR A SHORT WHILE BUT CONT. TO FEEL DISCOMFORT WHEN HE
EATS OR DRINKS, FEELS ITS GAS PAINS MAYBE SPASMS, DECLINES SECOND DILAUDID,
WILL GIVE TYLENOL NOW. HAS BEEN UP WALKING. WIFE IN ROOM.

## 2020-06-26 NOTE — NUR
PT RESTING IN BED, EYES CLOSED. RR EVEN AND UNLABORED. NO SIGNS OF PAIN OR
DISTRESS NOTED. CALL LIGHT IN REACH.

## 2020-06-26 NOTE — NUR
Spoke with Allen.  He plans on going home today, denies needs to dc.
Will go home with wife and family.

## 2022-03-14 NOTE — NUR
03/14/22 0811 Gretchen Hu
0807 PATIENT ARRIVES TO PACU SLEEPING. RESP EVEN AND UNLABORED, NC AT
2 LITERS. OXYGEN TURNED OFF.

## 2022-03-15 NOTE — OR
[facility]
                                    [address], [place]  [address]
_________________________________________________________________________________________
                                                                 Signed   
 
 
DATE OF OPERATION:
03/14/2022
 
SURGEON:
Loyda Song MD
 
PREOPERATIVE DIAGNOSES:
1. History of Hill repair for intractable gastroesophageal reflux.
2. New onset of burning epigastric pain without associated reflux or dysphagia.
 
POSTOPERATIVE DIAGNOSES:
1. Optimal reconstructive flap valve. No evidence of esophagitis.
2. Diffuse gastritis with pyloric channel kissing erosions.
 
PROCEDURE:
Esophagogastroduodenoscopy with biopsy.
 
ANESTHESIA:
Intravenous sedation; fentanyl 100 mcg, Versed 4 mg.
 
INDICATION:
This 36-year-old white man is a  in the [employer] [facility] [employer] and in 2020
underwent Hill repair (posterior gastropexy with reconstruction of the GE junction) with
intraoperative manometrics for intractable gastroesophageal reflux problems.  He has
previously undergone cholecystectomy.  He has no reflux symptoms now, but does have
significant epigastric pain which is worse in the morning and relieved by intake of ice
cream.  The patient is not obese.  He is admitted at this time to undergo upper
endoscopy to better characterize the problem. 
 
FINDINGS:
There was no evidence of reflux esophagitis and a hiatal hernia was well repaired.  Flap
valve was considered optimal.  There was, however, diffuse gastritis as well as two
"kissing" erosions at the pyloric channel.  The duodenum was normal. CLOtest was
negative. 
 
DESCRIPTION OF PROCEDURE:
The patient was brought to the endoscopy suite and given topical lidocaine
hypopharyngeal anesthesia and placed in lateral decubitus position.  He was given
intravenous sedation to the point of slurred speech and nystagmus with full
cardiopulmonary monitoring.  A bite block was placed.  An Olympus video upper endoscope
was passed in the hypopharynx.  The vocal cords appeared normal.  The scope was advanced
to the esophagus, throughout its length it was entirely normal including the distal
 
    Electronically Signed By: LOYDA SONG MD  03/15/22 6960
_________________________________________________________________________________________
PATIENT NAME:     LUZ ZIEGLER                
MEDICAL RECORD #: [id number]            OPERATIVE REPORT              
          ACCT #: [id number]  
DATE OF BIRTH:   [birth year]            REPORT #: [id number]      
PHYSICIAN:        LOYDA SONG MD                 
PCP:              NO PRIMARY CARE PHYSICIAN     
REPORT IS CONFIDENTIAL AND NOT TO BE RELEASED WITHOUT AUTHORIZATION
 
 
                                  [facility]
                                    [address], [place]  [address]
_________________________________________________________________________________________
                                                                 Signed   
 
 
portion.  There was no Parry's epithelium. No inflammation, stricture, or other
problem.  The scope was passed to the stomach, which was insufflated with air.  There
was no significant bile in the stomach.  There was a mild diffuse gastritis noted and
passage to the antrum allowed for visualization of two small erosions of the pyloric
area directly across from each other consistent with "kissing erosions."  The scope was
passed through the pylorus into the duodenum. Overall, it appeared reasonably normal.
Biopsies were obtained there.  The scope was withdrawn and biopsy taken of the pyloric
erosion itself as well as biopsies of the antrum for both BEATRICE and pathologic testing.
Retroflexed view undertaken showed an optimal flap valve, having undergone Hill repair
only two years ago.  The proximal stomach had inflammation as well and biopsies were
obtained there.  The scope was withdrawn to the distal esophagus where biopsies were
obtained.  The mucosa looked entirely normal.  Careful withdrawal of the scope showed no
other abnormality. 
 
CONCLUDING DIAGNOSIS:
His symptoms are well explained by diffuse gastritis and erosive changes of the pylorus.
 
PLAN:
We will initiate Prilosec 20 mg p.o. b.i.d. as well as Carafate 1 g p.o. q.i.d.  We will
see him back in 4 to 6 weeks and assess his progress regarding symptom control.  Await
biopsies as well as evolution of his CLOtest biopsies. 
 
 
 
            ________________________________________
            MD HAYLEY Combs/MODL
Job #:  [id number]
DD:  03/14/2022 08:17:19
DT:  03/14/2022 09:55:54
 
 
Copies:                                
~
 
 
 
 
 
 
 
    Electronically Signed By: LOYDA SONG MD  03/15/22 2200
_________________________________________________________________________________________
PATIENT NAME:     LUZ ZIEGLER                
MEDICAL RECORD #: [id number]            OPERATIVE REPORT              
          ACCT #: [id number]  
DATE OF BIRTH:   [birth year]            REPORT #: [id number]      
PHYSICIAN:        LOYDA SONG MD                 
PCP:              NO PRIMARY CARE PHYSICIAN     
REPORT IS CONFIDENTIAL AND NOT TO BE RELEASED WITHOUT AUTHORIZATION

## 2022-03-17 NOTE — PATH
[facility]
                                    [address], [place]  [address]
_________________________________________________________________________________________
                                                                 Signed   
 
 
 
SPECIMEN(S): A DUODENAL BIOPSY
SPECIMEN(S): B PYLORIC EROSIONS BIOPSY
SPECIMEN(S): C ANTRUM/PYLORUS BIOPSY
SPECIMEN(S): D PROXIMAL STOMACH BIOPSY
SPECIMEN(S): E LOW ESOPHAGEAL BIOPSY
 
SPECIMEN SOURCE:
A. DUODENAL BIOPSY
B. PYLORIC EROSIONS BIOPSY
C. ANTRUM/PYLORUS BIOPSY
D. PROXIMAL STOMACH BIOPSY
E. LOW ESOPHAGEAL BIOPSY
 
CLINICAL HISTORY:
Esophagogastroduodenoscopy.  Preop: Esophageal dysphagia, gastroesophageal 
reflux disease with esophagitis.  Postop: Diffuse gastritis, pyloric erosion. 
 
FINAL PATHOLOGIC DIAGNOSIS:
A.  Duodenum, biopsy:
-  No significant histopathology.
B.  Pyloric erosions, biopsy:
-  Chronic active gastritis with focal intestinal metaplasia.
-  No evidence of H. pylori at this time (see comment).
C.  Antrum/pylorus, biopsy:
-  No significant histopathologic alterations.
D.  Proximal stomach, biopsy:
-  Mild mucosal congestion.
-  No evidence of erosions or gastritis.
E.  Low esophagus, biopsy:
-  Portions of unremarkable squamous mucosa.
 
COMMENT:
Regarding specimen A, the sections from the duodenal biopsy show portions of 
duodenal mucosa with long finger-like villi.  There is no villous atrophy, 
crypt hyperplasia or intraepithelial 
lymphocytosis, making a diagnosis of celiac disease unlikely.  There is no 
evidence of peptic duodenitis, microorganisms, abnormal infiltrates or 
neoplasia. 
Regarding specimen B, the sections through the gastric biopsy show portions of 
acute and chronically inflamed gastric mucosa. The lamina propria contains 
neutrophils, lymphocytes and plasma cells. A 
 
                                                                                    
_________________________________________________________________________________________
PATIENT NAME:     LUZ ZIEGLER                
MEDICAL RECORD #: [id number]            PATHOLOGY                     
          ACCT #: [id number]       ACCESSION #: [id number]     
DATE OF BIRTH:   [birth year]            REPORT #: [id number]       
PHYSICIAN:        CHARO SHELLEY              
PCP:              NO PRIMARY CARE PHYSICIAN     
REPORT IS CONFIDENTIAL AND NOT TO BE RELEASED WITHOUT AUTHORIZATION
 
 
                                  [facility]
                                    [address], [place]  [address]
_________________________________________________________________________________________
                                                                 Signed   
 
 
careful search is made for H. pylori and none are identified at this time.  
There is focal intestinal metaplasia.  There is no dysplasia associated with 
the intestinal metaplasia and no evidence of 
either epithelial or lymphocytic malignancy. Confirmatory immunohistochemical 
stains are pending and results will follow in an addendum report. 
 
Regarding specimen C, the sections through the gastric biopsies show fragments 
of histologically unremarkable antral mucosa.  There is no evidence of acute or 
chronic inflammation.  There is no 
evidence of H. pylori, intestinal metaplasia, abnormal infiltrates or 
neoplasia. 
Regarding specimen D, the sections through the gastric biopsies show fragments 
of histologically unremarkable oxyntic mucosa. The mucosal biopsies show 
congestion, but there is no evidence of an 
erosion. There is no evidence of acute or chronic inflammation. There is no 
evidence of H. pylori, intestinal metaplasia, abnormal infiltrates or 
neoplasia. 
Regarding specimen E, the esophageal biopsy shows normal-appearing squamous 
epithelium.  There is no evidence of acute or chronic inflammation. 
TWK:emh:C2NR
 
MICROSCOPIC EXAMINATION:
Histologic sections of all submitted blocks are examined by light microscopy.  
These findings, together with the gross examination, support the pathologic 
diagnosis. 
 
GROSS DESCRIPTION:
Five specimens are received in five containers, labeled "SCOOBY."
A.  The specimen, labeled "SCOOBY, duodenum biopsy," is received in formalin and 
consists of two tan soft tissue fragments that measure 0.2 cm in greatest 
dimension.  The specimen is entirely submitted in 
cassette (A1).
B.  The specimen, labeled "SCOOBY, pyloric erosion biopsy," is received in formalin 
and consists of one tan soft tissue fragment that measures 0.2 cm in greatest 
dimension.  The specimen is entirely 
submitted in cassette (B1).
C.  The specimen, labeled "SCOOBY, antrum biopsy," is received in formalin and 
consists of two tan soft tissue fragments that measure 0.2 cm in greatest 
dimension.  The specimen is entirely submitted in 
cassette (C1).
D.  The specimen, labeled "SCOOBY, proximal stomach biopsy," is received in 
formalin and consists of two tan soft tissue fragments that measure 0.2-0.5 cm 
 
                                                                                    
_________________________________________________________________________________________
PATIENT NAME:     LUZ ZIEGLER                
MEDICAL RECORD #: [id number]            PATHOLOGY                     
          ACCT #: [id number]       ACCESSION #: [id number]     
DATE OF BIRTH:   [birth year]            REPORT #: [id number]       
PHYSICIAN:        [facility]              
PCP:              NO PRIMARY CARE PHYSICIAN     
REPORT IS CONFIDENTIAL AND NOT TO BE RELEASED WITHOUT AUTHORIZATION
 
 
                                  [facility]
                                    [address], [place]  [address]
_________________________________________________________________________________________
                                                                 Signed   
 
 
in greatest dimension.  The specimen is entirely 
submitted in cassette (D1).
 
E.  The specimen, labeled "SCOOBY, low esophagus biopsy," is received in formalin 
and consists of one tan soft tissue fragment that measures 0.2 cm in greatest 
dimension.  The specimen is entirely 
submitted in cassette (E1).
JS (under the direct supervision of a pathologist)
The Gross Description was prepared using a voice recognition system. The report 
was reviewed for accuracy; however, sound-alike word errors, addition and/or 
deletions may occur. If there is any 
question about this report, please contact Client Services.
 
PERFORMING LABORATORY:
The technical component was performed by [facility], [address] [place] [id number] (Medical Director: Barbara Nguyen MD; CLIA# [id number]). The 
professional interpretation was performed by 
[facility], [facility], [address] [place] 
[address]. 
 
Diagnostician:  Willy Perales MD
Pathologist
Electronically Signed 03/17/2022
 
 
Copies:                                
~
 
 
 
 
 
 
 
 
 
 
 
 
 
 
 
 
                                                                                    
_________________________________________________________________________________________
PATIENT NAME:     LUZ ZIEGLER                
MEDICAL RECORD #: [id number]            PATHOLOGY                     
          ACCT #: [id number]       ACCESSION #: [id number]     
DATE OF BIRTH:   [birth year]            REPORT #: [id number]       
PHYSICIAN:        [facility]              
PCP:              NO PRIMARY CARE PHYSICIAN     
REPORT IS CONFIDENTIAL AND NOT TO BE RELEASED WITHOUT AUTHORIZATION